# Patient Record
Sex: FEMALE | Race: WHITE | NOT HISPANIC OR LATINO
[De-identification: names, ages, dates, MRNs, and addresses within clinical notes are randomized per-mention and may not be internally consistent; named-entity substitution may affect disease eponyms.]

---

## 2017-10-29 ENCOUNTER — HEALTH MAINTENANCE LETTER (OUTPATIENT)
Age: 47
End: 2017-10-29

## 2018-01-02 ENCOUNTER — RADIANT APPOINTMENT (OUTPATIENT)
Dept: MAMMOGRAPHY | Facility: CLINIC | Age: 48
End: 2018-01-02
Attending: OBSTETRICS & GYNECOLOGY
Payer: COMMERCIAL

## 2018-01-02 DIAGNOSIS — Z12.31 ENCOUNTER FOR SCREENING MAMMOGRAM FOR BREAST CANCER: ICD-10-CM

## 2018-01-02 PROCEDURE — 77063 BREAST TOMOSYNTHESIS BI: CPT

## 2018-01-02 PROCEDURE — 77067 SCR MAMMO BI INCL CAD: CPT

## 2018-08-30 PROBLEM — D25.9 FIBROID UTERUS: Chronic | Status: ACTIVE | Noted: 2018-08-30

## 2018-08-30 PROBLEM — N80.9 ENDOMETRIOSIS: Chronic | Status: ACTIVE | Noted: 2018-08-30

## 2018-08-30 PROBLEM — D64.9 ANEMIA: Status: ACTIVE | Noted: 2018-08-30

## 2018-08-31 ENCOUNTER — HOSPITAL ENCOUNTER (INPATIENT)
Facility: CLINIC | Age: 48
LOS: 3 days | Discharge: HOME | End: 2018-09-03
Attending: OBSTETRICS & GYNECOLOGY | Admitting: OBSTETRICS & GYNECOLOGY
Payer: COMMERCIAL

## 2018-08-31 ENCOUNTER — ANESTHESIA EVENT (OUTPATIENT)
Dept: SURGERY | Facility: CLINIC | Age: 48
End: 2018-08-31
Payer: COMMERCIAL

## 2018-08-31 ENCOUNTER — ANESTHESIA (OUTPATIENT)
Dept: SURGERY | Facility: CLINIC | Age: 48
End: 2018-08-31
Payer: COMMERCIAL

## 2018-08-31 DIAGNOSIS — Z98.890 POST-OPERATIVE STATE: Primary | ICD-10-CM

## 2018-08-31 LAB
B-HCG SERPL-ACNC: <1 IU/L (ref 0–5)
HGB BLD-MCNC: 9.5 G/DL (ref 11.7–15.7)

## 2018-08-31 PROCEDURE — 0UT10ZZ RESECTION OF LEFT OVARY, OPEN APPROACH: ICD-10-PCS | Performed by: OBSTETRICS & GYNECOLOGY

## 2018-08-31 PROCEDURE — 25000128 H RX IP 250 OP 636: Performed by: ANESTHESIOLOGY

## 2018-08-31 PROCEDURE — 71000012 ZZH RECOVERY PHASE 1 LEVEL 1 FIRST HR: Performed by: OBSTETRICS & GYNECOLOGY

## 2018-08-31 PROCEDURE — 36415 COLL VENOUS BLD VENIPUNCTURE: CPT | Performed by: ANESTHESIOLOGY

## 2018-08-31 PROCEDURE — 88302 TISSUE EXAM BY PATHOLOGIST: CPT | Performed by: OBSTETRICS & GYNECOLOGY

## 2018-08-31 PROCEDURE — 25000125 ZZHC RX 250: Performed by: ANESTHESIOLOGY

## 2018-08-31 PROCEDURE — 71000013 ZZH RECOVERY PHASE 1 LEVEL 1 EA ADDTL HR: Performed by: OBSTETRICS & GYNECOLOGY

## 2018-08-31 PROCEDURE — 88305 TISSUE EXAM BY PATHOLOGIST: CPT | Performed by: OBSTETRICS & GYNECOLOGY

## 2018-08-31 PROCEDURE — 25000128 H RX IP 250 OP 636: Performed by: OBSTETRICS & GYNECOLOGY

## 2018-08-31 PROCEDURE — 25000125 ZZHC RX 250: Performed by: NURSE ANESTHETIST, CERTIFIED REGISTERED

## 2018-08-31 PROCEDURE — 88305 TISSUE EXAM BY PATHOLOGIST: CPT | Mod: 26 | Performed by: OBSTETRICS & GYNECOLOGY

## 2018-08-31 PROCEDURE — 25000566 ZZH SEVOFLURANE, EA 15 MIN: Performed by: OBSTETRICS & GYNECOLOGY

## 2018-08-31 PROCEDURE — 0UT90ZZ RESECTION OF UTERUS, OPEN APPROACH: ICD-10-PCS | Performed by: OBSTETRICS & GYNECOLOGY

## 2018-08-31 PROCEDURE — 0UB70ZZ EXCISION OF BILATERAL FALLOPIAN TUBES, OPEN APPROACH: ICD-10-PCS | Performed by: OBSTETRICS & GYNECOLOGY

## 2018-08-31 PROCEDURE — 88307 TISSUE EXAM BY PATHOLOGIST: CPT | Performed by: OBSTETRICS & GYNECOLOGY

## 2018-08-31 PROCEDURE — 85018 HEMOGLOBIN: CPT | Performed by: ANESTHESIOLOGY

## 2018-08-31 PROCEDURE — 27210995 ZZH RX 272: Performed by: OBSTETRICS & GYNECOLOGY

## 2018-08-31 PROCEDURE — 36000058 ZZH SURGERY LEVEL 3 EA 15 ADDTL MIN: Performed by: OBSTETRICS & GYNECOLOGY

## 2018-08-31 PROCEDURE — 88302 TISSUE EXAM BY PATHOLOGIST: CPT | Mod: 26 | Performed by: OBSTETRICS & GYNECOLOGY

## 2018-08-31 PROCEDURE — 88307 TISSUE EXAM BY PATHOLOGIST: CPT | Mod: 26 | Performed by: OBSTETRICS & GYNECOLOGY

## 2018-08-31 PROCEDURE — 12000007 ZZH R&B INTERMEDIATE

## 2018-08-31 PROCEDURE — 25000128 H RX IP 250 OP 636: Performed by: NURSE ANESTHETIST, CERTIFIED REGISTERED

## 2018-08-31 PROCEDURE — 40000170 ZZH STATISTIC PRE-PROCEDURE ASSESSMENT II: Performed by: OBSTETRICS & GYNECOLOGY

## 2018-08-31 PROCEDURE — 27210794 ZZH OR GENERAL SUPPLY STERILE: Performed by: OBSTETRICS & GYNECOLOGY

## 2018-08-31 PROCEDURE — 37000009 ZZH ANESTHESIA TECHNICAL FEE, EACH ADDTL 15 MIN: Performed by: OBSTETRICS & GYNECOLOGY

## 2018-08-31 PROCEDURE — 84702 CHORIONIC GONADOTROPIN TEST: CPT | Performed by: ANESTHESIOLOGY

## 2018-08-31 PROCEDURE — 0USG0ZZ REPOSITION VAGINA, OPEN APPROACH: ICD-10-PCS | Performed by: OBSTETRICS & GYNECOLOGY

## 2018-08-31 PROCEDURE — 37000008 ZZH ANESTHESIA TECHNICAL FEE, 1ST 30 MIN: Performed by: OBSTETRICS & GYNECOLOGY

## 2018-08-31 PROCEDURE — 25000132 ZZH RX MED GY IP 250 OP 250 PS 637: Performed by: OBSTETRICS & GYNECOLOGY

## 2018-08-31 PROCEDURE — 36000056 ZZH SURGERY LEVEL 3 1ST 30 MIN: Performed by: OBSTETRICS & GYNECOLOGY

## 2018-08-31 RX ORDER — GLYCOPYRROLATE 0.2 MG/ML
INJECTION, SOLUTION INTRAMUSCULAR; INTRAVENOUS PRN
Status: DISCONTINUED | OUTPATIENT
Start: 2018-08-31 | End: 2018-08-31

## 2018-08-31 RX ORDER — SODIUM CHLORIDE, SODIUM LACTATE, POTASSIUM CHLORIDE, CALCIUM CHLORIDE 600; 310; 30; 20 MG/100ML; MG/100ML; MG/100ML; MG/100ML
INJECTION, SOLUTION INTRAVENOUS CONTINUOUS
Status: DISCONTINUED | OUTPATIENT
Start: 2018-08-31 | End: 2018-09-03 | Stop reason: HOSPADM

## 2018-08-31 RX ORDER — SODIUM CHLORIDE, SODIUM LACTATE, POTASSIUM CHLORIDE, CALCIUM CHLORIDE 600; 310; 30; 20 MG/100ML; MG/100ML; MG/100ML; MG/100ML
INJECTION, SOLUTION INTRAVENOUS CONTINUOUS
Status: DISCONTINUED | OUTPATIENT
Start: 2018-08-31 | End: 2018-08-31 | Stop reason: HOSPADM

## 2018-08-31 RX ORDER — ONDANSETRON 4 MG/1
4 TABLET, ORALLY DISINTEGRATING ORAL EVERY 30 MIN PRN
Status: DISCONTINUED | OUTPATIENT
Start: 2018-08-31 | End: 2018-08-31 | Stop reason: HOSPADM

## 2018-08-31 RX ORDER — ONDANSETRON 4 MG/1
4 TABLET, ORALLY DISINTEGRATING ORAL EVERY 6 HOURS PRN
Status: DISCONTINUED | OUTPATIENT
Start: 2018-08-31 | End: 2018-09-03 | Stop reason: HOSPADM

## 2018-08-31 RX ORDER — METOCLOPRAMIDE 5 MG/1
10 TABLET ORAL EVERY 6 HOURS PRN
Status: DISCONTINUED | OUTPATIENT
Start: 2018-08-31 | End: 2018-09-03 | Stop reason: HOSPADM

## 2018-08-31 RX ORDER — ACETAMINOPHEN 325 MG/1
975 TABLET ORAL EVERY 8 HOURS
Status: DISCONTINUED | OUTPATIENT
Start: 2018-08-31 | End: 2018-09-03 | Stop reason: HOSPADM

## 2018-08-31 RX ORDER — DEXAMETHASONE SODIUM PHOSPHATE 4 MG/ML
INJECTION, SOLUTION INTRA-ARTICULAR; INTRALESIONAL; INTRAMUSCULAR; INTRAVENOUS; SOFT TISSUE PRN
Status: DISCONTINUED | OUTPATIENT
Start: 2018-08-31 | End: 2018-08-31

## 2018-08-31 RX ORDER — TEMAZEPAM 15 MG/1
15 CAPSULE ORAL
Status: DISCONTINUED | OUTPATIENT
Start: 2018-09-01 | End: 2018-09-03 | Stop reason: HOSPADM

## 2018-08-31 RX ORDER — FENTANYL CITRATE 50 UG/ML
INJECTION, SOLUTION INTRAMUSCULAR; INTRAVENOUS PRN
Status: DISCONTINUED | OUTPATIENT
Start: 2018-08-31 | End: 2018-08-31

## 2018-08-31 RX ORDER — MAGNESIUM HYDROXIDE 1200 MG/15ML
LIQUID ORAL PRN
Status: DISCONTINUED | OUTPATIENT
Start: 2018-08-31 | End: 2018-08-31 | Stop reason: HOSPADM

## 2018-08-31 RX ORDER — PROPOFOL 10 MG/ML
INJECTION, EMULSION INTRAVENOUS PRN
Status: DISCONTINUED | OUTPATIENT
Start: 2018-08-31 | End: 2018-08-31

## 2018-08-31 RX ORDER — KETOROLAC TROMETHAMINE 30 MG/ML
INJECTION, SOLUTION INTRAMUSCULAR; INTRAVENOUS PRN
Status: DISCONTINUED | OUTPATIENT
Start: 2018-08-31 | End: 2018-08-31

## 2018-08-31 RX ORDER — CEFAZOLIN SODIUM 2 G/100ML
2 INJECTION, SOLUTION INTRAVENOUS
Status: COMPLETED | OUTPATIENT
Start: 2018-08-31 | End: 2018-08-31

## 2018-08-31 RX ORDER — ONDANSETRON 2 MG/ML
INJECTION INTRAMUSCULAR; INTRAVENOUS PRN
Status: DISCONTINUED | OUTPATIENT
Start: 2018-08-31 | End: 2018-08-31

## 2018-08-31 RX ORDER — NALOXONE HYDROCHLORIDE 0.4 MG/ML
.1-.4 INJECTION, SOLUTION INTRAMUSCULAR; INTRAVENOUS; SUBCUTANEOUS
Status: DISCONTINUED | OUTPATIENT
Start: 2018-08-31 | End: 2018-08-31

## 2018-08-31 RX ORDER — NALOXONE HYDROCHLORIDE 0.4 MG/ML
.1-.4 INJECTION, SOLUTION INTRAMUSCULAR; INTRAVENOUS; SUBCUTANEOUS
Status: DISCONTINUED | OUTPATIENT
Start: 2018-08-31 | End: 2018-09-03 | Stop reason: HOSPADM

## 2018-08-31 RX ORDER — ONDANSETRON 2 MG/ML
4 INJECTION INTRAMUSCULAR; INTRAVENOUS EVERY 6 HOURS PRN
Status: DISCONTINUED | OUTPATIENT
Start: 2018-08-31 | End: 2018-09-03 | Stop reason: HOSPADM

## 2018-08-31 RX ORDER — HYDROMORPHONE HYDROCHLORIDE 1 MG/ML
.3-.5 INJECTION, SOLUTION INTRAMUSCULAR; INTRAVENOUS; SUBCUTANEOUS EVERY 5 MIN PRN
Status: DISCONTINUED | OUTPATIENT
Start: 2018-08-31 | End: 2018-08-31 | Stop reason: HOSPADM

## 2018-08-31 RX ORDER — OXYCODONE HYDROCHLORIDE 5 MG/1
5-10 TABLET ORAL
Status: DISCONTINUED | OUTPATIENT
Start: 2018-08-31 | End: 2018-09-03 | Stop reason: HOSPADM

## 2018-08-31 RX ORDER — VECURONIUM BROMIDE 1 MG/ML
INJECTION, POWDER, LYOPHILIZED, FOR SOLUTION INTRAVENOUS PRN
Status: DISCONTINUED | OUTPATIENT
Start: 2018-08-31 | End: 2018-08-31

## 2018-08-31 RX ORDER — FENTANYL CITRATE 50 UG/ML
25-50 INJECTION, SOLUTION INTRAMUSCULAR; INTRAVENOUS
Status: DISCONTINUED | OUTPATIENT
Start: 2018-08-31 | End: 2018-08-31 | Stop reason: HOSPADM

## 2018-08-31 RX ORDER — METOCLOPRAMIDE HYDROCHLORIDE 5 MG/ML
10 INJECTION INTRAMUSCULAR; INTRAVENOUS EVERY 6 HOURS PRN
Status: DISCONTINUED | OUTPATIENT
Start: 2018-08-31 | End: 2018-09-03 | Stop reason: HOSPADM

## 2018-08-31 RX ORDER — CEFAZOLIN SODIUM 1 G/3ML
1 INJECTION, POWDER, FOR SOLUTION INTRAMUSCULAR; INTRAVENOUS SEE ADMIN INSTRUCTIONS
Status: DISCONTINUED | OUTPATIENT
Start: 2018-08-31 | End: 2018-08-31 | Stop reason: HOSPADM

## 2018-08-31 RX ORDER — ONDANSETRON 2 MG/ML
4 INJECTION INTRAMUSCULAR; INTRAVENOUS EVERY 30 MIN PRN
Status: DISCONTINUED | OUTPATIENT
Start: 2018-08-31 | End: 2018-08-31 | Stop reason: HOSPADM

## 2018-08-31 RX ORDER — NEOSTIGMINE METHYLSULFATE 1 MG/ML
VIAL (ML) INJECTION PRN
Status: DISCONTINUED | OUTPATIENT
Start: 2018-08-31 | End: 2018-08-31

## 2018-08-31 RX ORDER — AMOXICILLIN 250 MG
2 CAPSULE ORAL 2 TIMES DAILY
Status: DISCONTINUED | OUTPATIENT
Start: 2018-08-31 | End: 2018-09-03 | Stop reason: HOSPADM

## 2018-08-31 RX ORDER — EPHEDRINE SULFATE 50 MG/ML
INJECTION, SOLUTION INTRAMUSCULAR; INTRAVENOUS; SUBCUTANEOUS PRN
Status: DISCONTINUED | OUTPATIENT
Start: 2018-08-31 | End: 2018-08-31

## 2018-08-31 RX ORDER — DIPHENHYDRAMINE HYDROCHLORIDE 50 MG/ML
25 INJECTION INTRAMUSCULAR; INTRAVENOUS EVERY 6 HOURS PRN
Status: DISCONTINUED | OUTPATIENT
Start: 2018-08-31 | End: 2018-09-03 | Stop reason: HOSPADM

## 2018-08-31 RX ORDER — PROPOFOL 10 MG/ML
INJECTION, EMULSION INTRAVENOUS CONTINUOUS PRN
Status: DISCONTINUED | OUTPATIENT
Start: 2018-08-31 | End: 2018-08-31

## 2018-08-31 RX ORDER — DIPHENHYDRAMINE HCL 25 MG
25 CAPSULE ORAL EVERY 6 HOURS PRN
Status: DISCONTINUED | OUTPATIENT
Start: 2018-08-31 | End: 2018-09-03 | Stop reason: HOSPADM

## 2018-08-31 RX ORDER — LIDOCAINE 40 MG/G
CREAM TOPICAL
Status: DISCONTINUED | OUTPATIENT
Start: 2018-08-31 | End: 2018-09-03 | Stop reason: HOSPADM

## 2018-08-31 RX ORDER — MEPERIDINE HYDROCHLORIDE 25 MG/ML
12.5 INJECTION INTRAMUSCULAR; INTRAVENOUS; SUBCUTANEOUS EVERY 5 MIN PRN
Status: DISCONTINUED | OUTPATIENT
Start: 2018-08-31 | End: 2018-08-31 | Stop reason: HOSPADM

## 2018-08-31 RX ORDER — SCOLOPAMINE TRANSDERMAL SYSTEM 1 MG/1
1 PATCH, EXTENDED RELEASE TRANSDERMAL ONCE
Status: COMPLETED | OUTPATIENT
Start: 2018-08-31 | End: 2018-08-31

## 2018-08-31 RX ORDER — FERROUS SULFATE 325(65) MG
325 TABLET ORAL 3 TIMES DAILY
COMMUNITY

## 2018-08-31 RX ORDER — LIDOCAINE HYDROCHLORIDE 20 MG/ML
INJECTION, SOLUTION INFILTRATION; PERINEURAL PRN
Status: DISCONTINUED | OUTPATIENT
Start: 2018-08-31 | End: 2018-08-31

## 2018-08-31 RX ORDER — AMOXICILLIN 250 MG
1 CAPSULE ORAL 2 TIMES DAILY
Status: DISCONTINUED | OUTPATIENT
Start: 2018-08-31 | End: 2018-09-03 | Stop reason: HOSPADM

## 2018-08-31 RX ORDER — KETOROLAC TROMETHAMINE 30 MG/ML
30 INJECTION, SOLUTION INTRAMUSCULAR; INTRAVENOUS EVERY 6 HOURS PRN
Status: DISCONTINUED | OUTPATIENT
Start: 2018-08-31 | End: 2018-09-01

## 2018-08-31 RX ORDER — ACETAMINOPHEN 325 MG/1
650 TABLET ORAL EVERY 4 HOURS PRN
Status: DISCONTINUED | OUTPATIENT
Start: 2018-09-03 | End: 2018-09-03 | Stop reason: HOSPADM

## 2018-08-31 RX ADMIN — Medication 5 MG: at 07:57

## 2018-08-31 RX ADMIN — FENTANYL CITRATE 50 MCG: 50 INJECTION, SOLUTION INTRAMUSCULAR; INTRAVENOUS at 08:25

## 2018-08-31 RX ADMIN — SODIUM CHLORIDE, POTASSIUM CHLORIDE, SODIUM LACTATE AND CALCIUM CHLORIDE: 600; 310; 30; 20 INJECTION, SOLUTION INTRAVENOUS at 14:14

## 2018-08-31 RX ADMIN — NEOSTIGMINE METHYLSULFATE 3 MG: 1 INJECTION, SOLUTION INTRAVENOUS at 09:58

## 2018-08-31 RX ADMIN — LIDOCAINE HYDROCHLORIDE 0.5 ML: 10 INJECTION, SOLUTION EPIDURAL; INFILTRATION; INTRACAUDAL; PERINEURAL at 06:38

## 2018-08-31 RX ADMIN — PROPOFOL 180 MG: 10 INJECTION, EMULSION INTRAVENOUS at 07:50

## 2018-08-31 RX ADMIN — DEXAMETHASONE SODIUM PHOSPHATE 4 MG: 4 INJECTION, SOLUTION INTRA-ARTICULAR; INTRALESIONAL; INTRAMUSCULAR; INTRAVENOUS; SOFT TISSUE at 07:56

## 2018-08-31 RX ADMIN — CEFAZOLIN SODIUM 2 G: 2 INJECTION, SOLUTION INTRAVENOUS at 07:54

## 2018-08-31 RX ADMIN — PROPOFOL 30 MCG/KG/MIN: 10 INJECTION, EMULSION INTRAVENOUS at 07:55

## 2018-08-31 RX ADMIN — Medication 0.5 MG: at 10:46

## 2018-08-31 RX ADMIN — Medication: at 11:33

## 2018-08-31 RX ADMIN — ROCURONIUM BROMIDE 50 MG: 10 INJECTION INTRAVENOUS at 07:50

## 2018-08-31 RX ADMIN — CEFAZOLIN 1 G: 1 INJECTION, POWDER, FOR SOLUTION INTRAMUSCULAR; INTRAVENOUS at 09:50

## 2018-08-31 RX ADMIN — FENTANYL CITRATE 50 MCG: 50 INJECTION, SOLUTION INTRAMUSCULAR; INTRAVENOUS at 08:10

## 2018-08-31 RX ADMIN — Medication 5 MG: at 07:56

## 2018-08-31 RX ADMIN — SENNOSIDES AND DOCUSATE SODIUM 1 TABLET: 8.6; 5 TABLET ORAL at 14:54

## 2018-08-31 RX ADMIN — ACETAMINOPHEN 975 MG: 325 TABLET, FILM COATED ORAL at 14:54

## 2018-08-31 RX ADMIN — SODIUM CHLORIDE, POTASSIUM CHLORIDE, SODIUM LACTATE AND CALCIUM CHLORIDE: 600; 310; 30; 20 INJECTION, SOLUTION INTRAVENOUS at 06:38

## 2018-08-31 RX ADMIN — FENTANYL CITRATE 25 MCG: 50 INJECTION, SOLUTION INTRAMUSCULAR; INTRAVENOUS at 10:24

## 2018-08-31 RX ADMIN — MIDAZOLAM 2 MG: 1 INJECTION INTRAMUSCULAR; INTRAVENOUS at 07:45

## 2018-08-31 RX ADMIN — SCOLOPAMINE TRANSDERMAL SYSTEM 1 PATCH: 1 PATCH, EXTENDED RELEASE TRANSDERMAL at 06:59

## 2018-08-31 RX ADMIN — ONDANSETRON 4 MG: 2 INJECTION INTRAMUSCULAR; INTRAVENOUS at 09:23

## 2018-08-31 RX ADMIN — LIDOCAINE HYDROCHLORIDE 100 MG: 20 INJECTION, SOLUTION INFILTRATION; PERINEURAL at 07:50

## 2018-08-31 RX ADMIN — Medication 0.5 MG: at 11:01

## 2018-08-31 RX ADMIN — ONDANSETRON 4 MG: 2 INJECTION INTRAMUSCULAR; INTRAVENOUS at 10:42

## 2018-08-31 RX ADMIN — KETOROLAC TROMETHAMINE 30 MG: 30 INJECTION, SOLUTION INTRAMUSCULAR at 10:17

## 2018-08-31 RX ADMIN — GLYCOPYRROLATE 0.4 MG: 0.2 INJECTION, SOLUTION INTRAMUSCULAR; INTRAVENOUS at 09:58

## 2018-08-31 RX ADMIN — SODIUM CHLORIDE, POTASSIUM CHLORIDE, SODIUM LACTATE AND CALCIUM CHLORIDE: 600; 310; 30; 20 INJECTION, SOLUTION INTRAVENOUS at 08:52

## 2018-08-31 RX ADMIN — VECURONIUM BROMIDE 1 MG: 1 INJECTION, POWDER, LYOPHILIZED, FOR SOLUTION INTRAVENOUS at 09:00

## 2018-08-31 RX ADMIN — ONDANSETRON 4 MG: 2 INJECTION INTRAMUSCULAR; INTRAVENOUS at 17:34

## 2018-08-31 RX ADMIN — FENTANYL CITRATE 100 MCG: 50 INJECTION, SOLUTION INTRAMUSCULAR; INTRAVENOUS at 07:50

## 2018-08-31 RX ADMIN — SENNOSIDES AND DOCUSATE SODIUM 1 TABLET: 8.6; 5 TABLET ORAL at 20:45

## 2018-08-31 ASSESSMENT — ACTIVITIES OF DAILY LIVING (ADL)
ADLS_ACUITY_SCORE: 9

## 2018-08-31 NOTE — IP AVS SNAPSHOT
[facility]    [address] [place] [address]    Phone:  [phone]                                       After Visit Summary   8/31/2018    Indira Jc    MRN: [id number]           After Visit Summary Signature Page     I have received my discharge instructions, and my questions have been answered. I have discussed any challenges I see with this plan with the nurse or doctor.    ..........................................................................................................................................  Patient/Patient Representative Signature      ..........................................................................................................................................  Patient Representative Print Name and Relationship to Patient    ..................................................               ................................................  Date                                            Time    ..........................................................................................................................................  Reviewed by Signature/Title    ...................................................              ..............................................  Date                                                            Time          22EPIC Rev 08/18

## 2018-08-31 NOTE — ANESTHESIA PREPROCEDURE EVALUATION
Anesthesia Evaluation     .             ROS/MED HX    ENT/Pulmonary:  - neg pulmonary ROS    (-) sleep apnea   Neurologic:  - neg neurologic ROS     Cardiovascular:  - neg cardiovascular ROS       METS/Exercise Tolerance:     Hematologic:     (+) Anemia, -      Musculoskeletal:  - neg musculoskeletal ROS       GI/Hepatic:  - neg GI/hepatic ROS      (-) GERD   Renal/Genitourinary:     (+) Other Renal/ Genitourinary, uterine fibroids, endometriosis      Endo:  - neg endo ROS       Psychiatric:  - neg psychiatric ROS       Infectious Disease:  - neg infectious disease ROS       Malignancy:      - no malignancy   Other:                     Physical Exam  Normal systems: cardiovascular, pulmonary and dental    Airway   Mallampati: II  TM distance: >3 FB  Neck ROM: full    Dental     Cardiovascular       Pulmonary                     Anesthesia Plan      History & Physical Review  History and physical reviewed and following examination; no interval change.    ASA Status:  2 .    NPO Status:  > 8 hours    Plan for General and ETT with Intravenous induction. Maintenance will be Balanced.    PONV prophylaxis:  Ondansetron (or other 5HT-3), Dexamethasone or Solumedrol and Scopolamine patch  Propofol infusion      Postoperative Care  Postoperative pain management:  IV analgesics.      Consents  Anesthetic plan, risks, benefits and alternatives discussed with:  Patient..        Procedure: Procedure(s):  COMBINED HYSTERECTOMY TOTAL ABDOMINAL, SALPINGO-OOPHORECTOMY  Preop diagnosis: UTERINE FIBROIDS AND ENDOMETRIOSIS    No Known Allergies  Past Medical History:   Diagnosis Date     AMA (advanced maternal age) multigravida 35+ 5/15/2015     Anemia      Endometriosis      Fibroid uterus      Past Surgical History:   Procedure Laterality Date     ABDOMEN SURGERY      exploratory lap x2     ABDOMEN SURGERY      C section     GYN SURGERY      myomectomy     Social History   Substance Use Topics     Smoking status: Never Smoker      Smokeless tobacco: Current User     Alcohol use Yes      Comment: 1 glass of wine a day     Prior to Admission medications    Medication Sig Start Date End Date Taking? Authorizing Provider   ferrous sulfate (IRON) 325 (65 Fe) MG tablet Take 325 mg by mouth 3 times daily   Yes Reported, Patient     Current Facility-Administered Medications Ordered in Epic   Medication Dose Route Frequency Last Rate Last Dose     ceFAZolin (ANCEF) 1 g vial to attach to  ml bag for ADULT or 50 ml bag for PEDS  1 g Intravenous See Admin Instructions         ceFAZolin (ANCEF) intermittent infusion 2 g in 100 mL dextrose PRE-MIX  2 g Intravenous Pre-Op/Pre-procedure x 1 dose         lactated ringers infusion   Intravenous Continuous 25 mL/hr at 08/31/18 0638       lidocaine 1 % 1 mL  1 mL Other Q1H PRN   0.5 mL at 08/31/18 0638     sodium chloride (PF) 0.9% PF flush 3 mL  3 mL Intracatheter Q8H         No current [facility]-ordered outpatient prescriptions on file.       lactated ringers 25 mL/hr at 08/31/18 0638     Wt Readings from Last 1 Encounters:   08/31/18 55.8 kg (123 lb)     Temp Readings from Last 1 Encounters:   08/31/18 36.6  C (97.8  F) (Temporal)     BP Readings from Last 6 Encounters:   08/31/18 113/61     Pulse Readings from Last 4 Encounters:   08/31/18 66     Resp Readings from Last 1 Encounters:   08/31/18 16     SpO2 Readings from Last 1 Encounters:   08/31/18 100%     No results for input(s): NA, POTASSIUM, CHLORIDE, CO2, ANIONGAP, GLC, BUN, CR, JONAS in the last 69949 hours.  No results for input(s): AST, ALT, ALKPHOS, BILITOTAL, LIPASE in the last 92181 hours.  No results for input(s): WBC, HGB, PLT in the last 86867 hours.  No results for input(s): ABO, RH in the last 62681 hours.  No results for input(s): INR, PTT in the last 07068 hours.   No results for input(s): TROPI in the last 22263 hours.  No results for input(s): PH, PCO2, PO2, HCO3 in the last 14819 hours.  No results for input(s): HCG in the last 03047  hours.  No results found for this or any previous visit (from the past 744 hour(s)).    RECENT LABS:   ECG:   ECHO:                       .

## 2018-08-31 NOTE — OP NOTE
Gynecology Brief Operative Note    Pre-operative diagnosis: UTERINE FIBROIDS AND ENDOMETRIOSIS--PELVIC PAIN   Post-operative diagnosis Same   Procedure: Procedure(s):  COMBINED HYSTERECTOMY TOTAL ABDOMINAL,LEFT  SALPINGO-OOPHORECTOMY, RIGHT SALPINGECTOMY--MODIFIED VAULT SUSUPENSION   Surgeon: Александр Smyth MD   Assistants(s): DR SIMMS   Anesthesia: General    Estimated blood loss: 100 CC    Total IV fluids: (See anesthesia record)   Blood transfusion: No transfusion was given during surgery   Total urine output: (See anesthesia record)   Drains: None   Specimens: UTERUS AN BOTH TUBES AND LEFT OVARY   Implants: None   Findings: FIBROID , ENDOMETRIOSIS  RECTUM ATTACHED TO CDS AND UTERUS---BOWEL ADHESIONS TO LEFT OVARY AND UTERUS   Complications: None   Condition: Stable   Comments: See dictated operative report for full details

## 2018-08-31 NOTE — IP AVS SNAPSHOT
MRN:[id number]                      After Visit Summary   8/31/2018    Indira Jc    MRN: [id number]           Thank you!     Thank you for choosing [facility] for your care. Our goal is always to provide you with excellent care. Hearing back from our patients is one way we can continue to improve our services. Please take a few minutes to complete the written survey that you may receive in the mail after you visit with us. Thank you!        Patient Information     Date Of Birth          1970        Designated Caregiver       Most Recent Value    Caregiver    Will someone help with your care after discharge? yes    Name of designated caregiver Brady-spouse    Phone number of caregiver [phone]    Caregiver address [address]      About your hospital stay     You were admitted on:  August 31, 2018 You last received care in the:  [facility]    You were discharged on:  September 3, 2018       Who to Call     For medical emergencies, please call 911.  For non-urgent questions about your medical care, please call your primary care provider or clinic, [phone]  For questions related to your surgery, please call your surgery clinic        Attending Provider     Provider Specialty    Александр Smyth MD OB/Gyn       Primary Care Provider Office Phone # Fax #    Александр Smyth [phone]      After Care Instructions     Activity       Your activity upon discharge: no sex for 6 weeks, no driving while on analgesics and no heavy lifting for 6 weeks            Diet       Follow this diet upon discharge: Regular            Wound care and dressings       Instructions to care for your wound at home: remove steri-strips in 7-10 days.                  Follow-up Appointments     Follow-up and recommended labs and tests        Follow up with Dr. Smyth , at (location with clinic name or city) [facility], within 2 weeks  to evaluate after  surgery.                  Further instructions from your care team       Discharge Instructions following Hysterectomy  [facility]    Please return to the clinic in:       2 weeks;         4 weeks;          6 weeks   Make this appointment after you get home.  Diet:    You may feel less hungry at first.  Try to eat a well balanced diet with lots of proteins, fruits, vegetables, and whole grains.  Avoid spicy and greasy foods.    Drink plenty of fluids - at least 8 tall glasses a day.  Try to limit caffeine (found in coffee, tea, and cola drinks).  This helps prevent constipation (hard stools that are difficult to pass).    If constipation is a problem,  consider one of these medicines: docusate (Colace), docusate with casanthranol (Vero-Colace), psyllium (Metamucil) or milk of magnesia.  You can buy these at the drug store.  Follow the instructions listed on the label.  Activity:    You will need plenty of rest at first.  Slowly go back to your normal activities.  It will help to take several short walks during the day.  It is ok to climb stairs, but use the handrail in case you get dizzy.    Do not drive while using strong pain medications (narcotics).  After that, do not drive until you can stomp on the brakes without pain.      Do not use tampons, douche, or have sex (intercourse) until you see your doctor again.    Don t lift or push anything over 20 lbs until your doctor says it s safe.  Avoid heavy or strenuous exercise.    Your doctor will tell you when you can safely return to work.  Pain Control:    You may have pain around your incisions (surgery wounds).  This should improve over the next week.  Use your pain medicines as directed.  If you have increased pain, please call the clinic.  It is normal to see a little sore after mild exercise.    For the next two days, you may have some pain in your shoulders and upper chest.  This is from the air pumped into your during  the surgery.  To relieve this type of pain, you may take Tylenol (acetaminophen) or Advil (ibuprofen).  Follow the instructions listed on the label.  Drink a full glass of water with each dose.  You can also try lying flat or rising your hips above shoulder level.  Short, frequent walks should help as well.  Bathing/Incision Care:    May shower as desired but avoid tub baths (submerging incision) until incisions are healed.    If present, Steri-Strips (small, white tape) will fall off on their own in 7-10 days.    Your body will absorb our stitches over time.  Keep them clean and dry.    Wear loose, comfortable clothing.  Normal Symptoms:    You may notice a small amount of bleeding from your vagina.  This could last up to a week.  You may also have brown fluid leaking from the vagina.  This could last for a few weeks.  Wear pads as needed.    You may have bruising on your belly.  You might also have a puffy feeling in your belly.    You may see a little blood or fluid oozing from the incision.  Hormones:    If we removed your ovaries, you may need hormone medicine (HT, or hormone therapy).  Discuss this with your doctor.  If we did not remove your ovaries, you should reach menopause at the normal time (in general, between ages 40 and 55).  Notify your surgeon for the following signs and symptoms:    Severe chills and temperature of 100.4 oF or greater, taken under the tongue.    Bright red blood or large clots coming out of the vagina - enough to soak one pad (sanitary napkin) per hour.    Increased pain, warmth, swelling, redness at incision site.    Foul smelling, green/yellow discharge from incision.    Urine or vaginal fluid that smells bad.    You cannot urinate (use the toilet), it burns when you urinate, or you need to go more often.    Severe nausea (feeling sick to your stomach) or vomiting (throwing up).    Increased pain that you cannot control with medicine.    Any questions or concerns.      Pending  "Results     Date and Time Order Name Status Description    2018 0850 Surgical pathology exam In process             Statement of Approval     Ordered          18 0922  I have reviewed and agree with all the recommendations and orders detailed in this document.  EFFECTIVE NOW     Comments:  DC 9/3 if afebrile and doing well. Remove staples prior to DC.   Approved and electronically signed by:  Libra David MD             Admission Information     Date & Time Provider Department Dept. Phone    2018 Александр Smyth MD [place] [address] [facility] [phone]      Your Vitals Were     Blood Pressure Pulse Temperature Respirations Height Weight    103/59 57 98.4  F (36.9  C) (Oral) 15 1.676 m (5' 6\") 58.6 kg (129 lb 3 oz)    Last Period Pulse Oximetry BMI (Body Mass Index)             2018 96% 20.85 kg/m2         [email or web address] Information     [email or web address] lets you send messages to your doctor, view your test results, renew your prescriptions, schedule appointments and more. To sign up, go to [email or web address] . Click on \"Log in\" on the left side of the screen, which will take you to the Welcome page. Then click on \"Sign up Now\" on the right side of the page.     You will be asked to enter the access code listed below, as well as some personal information. Please follow the directions to create your username and password.     Your access code is: [email or web address]  Expires: 2018 10:07 AM     Your access code will  in 90 days. If you need help or a new code, please call your [place] clinic or [phone].        Care EveryWhere ID     This is your Care EveryWhere ID. This could be used by other organizations to access your [facility] medical records  [id number]        Equal Access to Services     CATHY HARDEN : Nabor Danielle, waboyda luhever, qaybta kaalkeiry berger, saige meredith. So [facility] [phone].    ATENCIÓN: Si habla " español, tiene a arango disposición servicios gratuitos de asistencia lingüística. Olayinka trejo [phone].    We comply with applicable federal civil rights laws and [place] laws. We do not discriminate on the basis of race, color, national origin, age, disability, sex, sexual orientation, or gender identity.               Review of your medicines      START taking        Dose / Directions    oxyCODONE IR 5 MG tablet   Commonly known as:  ROXICODONE        Dose:  5-10 mg   Take 1-2 tablets (5-10 mg) by mouth every 3 hours as needed for other (pain control or improvement in physical function. Hold dose for analgesic side effects.)   Quantity:  30 tablet   Refills:  0         CONTINUE these medicines which have NOT CHANGED        Dose / Directions    ferrous sulfate 325 (65 Fe) MG tablet   Commonly known as:  IRON        Dose:  325 mg   Take 325 mg by mouth 3 times daily   Refills:  0            Where to get your medicines      Some of these will need a paper prescription and others can be bought over the counter. Ask your nurse if you have questions.     Bring a paper prescription for each of these medications     oxyCODONE IR 5 MG tablet                Protect others around you: Learn how to safely use, store and throw away your medicines at www.disposemymeds.org.        Information about OPIOIDS     PRESCRIPTION OPIOIDS: WHAT YOU NEED TO KNOW   We gave you an opioid (narcotic) pain medicine. It is important to manage your pain, but opioids are not always the best choice. You should first try all the other options your care team gave you. Take this medicine for as short a time (and as few doses) as possible.    Some activities can increase your pain, such as bandage changes or therapy sessions. It may help to take your pain medicine 30 to 60 minutes before these activities. Reduce your stress by getting enough sleep, working on hobbies you enjoy and practicing relaxation or meditation. Talk to your care team about ways  to manage your pain beyond prescription opioids.    These medicines have risks:    DO NOT drive when on new or higher doses of pain medicine. These medicines can affect your alertness and reaction times, and you could be arrested for driving under the influence (DUI). If you need to use opioids long-term, talk to your care team about driving.    DO NOT operate heavy machinery    DO NOT do any other dangerous activities while taking these medicines.    DO NOT drink any alcohol while taking these medicines.     If the opioid prescribed includes acetaminophen, DO NOT take with any other medicines that contain acetaminophen. Read all labels carefully. Look for the word  acetaminophen  or  Tylenol.  Ask your pharmacist if you have questions or are unsure.    You can get addicted to pain medicines, especially if you have a history of addiction (chemical, alcohol or substance dependence). Talk to your care team about ways to reduce this risk.    All opioids tend to cause constipation. Drink plenty of water and eat foods that have a lot of fiber, such as fruits, vegetables, prune juice, apple juice and high-fiber cereal. Take a laxative (Miralax, milk of magnesia, Colace, Senna) if you don t move your bowels at least every other day. Other side effects include upset stomach, sleepiness, dizziness, throwing up, tolerance (needing more of the medicine to have the same effect), physical dependence and slowed breathing.    Store your pills in a secure place, locked if possible. We will not replace any lost or stolen medicine. If you don t finish your medicine, please throw away (dispose) as directed by your pharmacist. The [place] Pollution Control Agency has more information about safe disposal: https://www.pca.[facility].[place].us/living-green/managing-unwanted-medications             Medication List: This is a list of all your medications and when to take them. Check marks below indicate your daily home schedule. Keep this list as  a reference.      Medications           Morning Afternoon Evening Bedtime As Needed    ferrous sulfate 325 (65 Fe) MG tablet   Commonly known as:  IRON   Take 325 mg by mouth 3 times daily                         Resume        oxyCODONE IR 5 MG tablet   Commonly known as:  ROXICODONE   Take 1-2 tablets (5-10 mg) by mouth every 3 hours as needed for other (pain control or improvement in physical function. Hold dose for analgesic side effects.)   Last time this was given:  5 mg on 9/3/2018  9:22 AM   Next Dose Due:  Available at 12:22 pm                             Available at 12:22 pm

## 2018-08-31 NOTE — PLAN OF CARE
Problem: Patient Care Overview  Goal: Plan of Care/Patient Progress Review  Outcome: Improving  Patient arrived from PACU at 1215. A&O x4. VSS on room air. HR bradycardic in the high 50s, nonsymptomatic. Lungs clear. IS to 750. BS+, gas- and BM-. Incision covered, CDI, no drainage. Minimal spotting on yasmine pad. Tolerating clears. Some nausea, PRN zofran given. Hooker patent. Dangled at bedside and briefly sat in chair, had some nausea and lightheadedness, so went back to bed. PCA for pain control with scheduled Tylenol.

## 2018-08-31 NOTE — ANESTHESIA CARE TRANSFER NOTE
Patient: Indira Jc    Procedure(s):  TOTAL ABDOMINAL HYSTERECTOMY, BILATERAL SALPINGECTOMY, LEFT OOPHORECTOMY, LYSIS OF ADHESIONS - Wound Class: II-Clean Contaminated    Diagnosis: UTERINE FIBROIDS AND ENDOMETRIOSIS  Diagnosis Additional Information: No value filed.    Anesthesia Type:   General, ETT     Note:  Airway :Face Mask  Patient transferred to:PACU  Comments: Pt exhibits spontaneous respirations, follows commands, suctioned, extubated, dentition unchanged, exchanging well, transferred to pacu with 10L O2 via mask, all monitors and alarms on, report to Tenzin RODRIGUES Report: Identifed the Patient, Identified the Reponsible Provider, Reviewed the pertinent medical history, Discussed the surgical course, Reviewed Intra-OP anesthesia mangement and issues during anesthesia, Set expectations for post-procedure period and Allowed opportunity for questions and acknowledgement of understanding      Vitals: (Last set prior to Anesthesia Care Transfer)    CRNA VITALS  8/31/2018 1001 - 8/31/2018 1037      8/31/2018             Pulse: 76    SpO2: 98 %    Resp Rate (set): 10                Electronically Signed By: GAURI Jrodan CRNA  August 31, 2018  10:37 AM

## 2018-08-31 NOTE — PROGRESS NOTES
Admission medication history interview status for the 8/31/2018  admission is complete. See EPIC admission navigator for prior to admission medications     Medication history source reliability:Good    Medication history interview source(s):Patient    Medication history resources (including written lists, pill bottles, clinic record):None    Primary pharmacy.[facility]    Additional medication history information not noted on PTA med list :None    Time spent in this activity: 40 minutes    Prior to Admission medications    Medication Sig Last Dose Taking? Auth Provider   ferrous sulfate (IRON) 325 (65 Fe) MG tablet Take 325 mg by mouth 3 times daily 8/30/2018 at 2100 Yes Reported, Patient

## 2018-08-31 NOTE — ANESTHESIA POSTPROCEDURE EVALUATION
Patient: Indira Jc    Procedure(s):  TOTAL ABDOMINAL HYSTERECTOMY, BILATERAL SALPINGECTOMY, LEFT OOPHORECTOMY, LYSIS OF ADHESIONS - Wound Class: II-Clean Contaminated    Diagnosis:UTERINE FIBROIDS AND ENDOMETRIOSIS  Diagnosis Additional Information: No value filed.    Anesthesia Type:  General, ETT    Note:  Anesthesia Post Evaluation    Patient location during evaluation: Bedside  Patient participation: Able to fully participate in evaluation  Level of consciousness: awake and alert  Pain management: adequate  Airway patency: patent  Cardiovascular status: acceptable  Respiratory status: acceptable  Hydration status: acceptable  PONV: none             Last vitals:  Vitals:    08/31/18 1245 08/31/18 1315 08/31/18 1415   BP: 106/63 103/60 107/63   Pulse:      Resp: 12 12 12   Temp:      SpO2: 95% 96% 97%         Electronically Signed By: Rupert Polanco MD  August 31, 2018  3:41 PM

## 2018-09-01 LAB — HGB BLD-MCNC: 8.3 G/DL (ref 11.7–15.7)

## 2018-09-01 PROCEDURE — 25000132 ZZH RX MED GY IP 250 OP 250 PS 637: Performed by: OBSTETRICS & GYNECOLOGY

## 2018-09-01 PROCEDURE — 36415 COLL VENOUS BLD VENIPUNCTURE: CPT | Performed by: OBSTETRICS & GYNECOLOGY

## 2018-09-01 PROCEDURE — 85018 HEMOGLOBIN: CPT | Performed by: OBSTETRICS & GYNECOLOGY

## 2018-09-01 PROCEDURE — 25000128 H RX IP 250 OP 636: Performed by: OBSTETRICS & GYNECOLOGY

## 2018-09-01 PROCEDURE — 12000007 ZZH R&B INTERMEDIATE

## 2018-09-01 RX ORDER — METHYLPREDNISOLONE SODIUM SUCCINATE 125 MG/2ML
125 INJECTION, POWDER, LYOPHILIZED, FOR SOLUTION INTRAMUSCULAR; INTRAVENOUS
Status: DISCONTINUED | OUTPATIENT
Start: 2018-09-01 | End: 2018-09-03 | Stop reason: HOSPADM

## 2018-09-01 RX ORDER — DIPHENHYDRAMINE HYDROCHLORIDE 50 MG/ML
50 INJECTION INTRAMUSCULAR; INTRAVENOUS
Status: DISCONTINUED | OUTPATIENT
Start: 2018-09-01 | End: 2018-09-03 | Stop reason: HOSPADM

## 2018-09-01 RX ORDER — IBUPROFEN 600 MG/1
600 TABLET, FILM COATED ORAL EVERY 6 HOURS PRN
Status: DISCONTINUED | OUTPATIENT
Start: 2018-09-01 | End: 2018-09-03 | Stop reason: HOSPADM

## 2018-09-01 RX ADMIN — SODIUM CHLORIDE, POTASSIUM CHLORIDE, SODIUM LACTATE AND CALCIUM CHLORIDE: 600; 310; 30; 20 INJECTION, SOLUTION INTRAVENOUS at 10:06

## 2018-09-01 RX ADMIN — OXYCODONE HYDROCHLORIDE 5 MG: 5 TABLET ORAL at 13:00

## 2018-09-01 RX ADMIN — IBUPROFEN 600 MG: 600 TABLET ORAL at 20:07

## 2018-09-01 RX ADMIN — ACETAMINOPHEN 975 MG: 325 TABLET, FILM COATED ORAL at 06:54

## 2018-09-01 RX ADMIN — SODIUM CHLORIDE, POTASSIUM CHLORIDE, SODIUM LACTATE AND CALCIUM CHLORIDE: 600; 310; 30; 20 INJECTION, SOLUTION INTRAVENOUS at 00:19

## 2018-09-01 RX ADMIN — OXYCODONE HYDROCHLORIDE 10 MG: 5 TABLET ORAL at 15:24

## 2018-09-01 RX ADMIN — SENNOSIDES AND DOCUSATE SODIUM 2 TABLET: 8.6; 5 TABLET ORAL at 20:07

## 2018-09-01 RX ADMIN — IRON SUCROSE 300 MG: 20 INJECTION, SOLUTION INTRAVENOUS at 12:38

## 2018-09-01 RX ADMIN — OXYCODONE HYDROCHLORIDE 5 MG: 5 TABLET ORAL at 11:56

## 2018-09-01 RX ADMIN — OXYCODONE HYDROCHLORIDE 10 MG: 5 TABLET ORAL at 18:42

## 2018-09-01 RX ADMIN — SODIUM CHLORIDE, POTASSIUM CHLORIDE, SODIUM LACTATE AND CALCIUM CHLORIDE: 600; 310; 30; 20 INJECTION, SOLUTION INTRAVENOUS at 21:07

## 2018-09-01 RX ADMIN — ACETAMINOPHEN 975 MG: 325 TABLET, FILM COATED ORAL at 15:24

## 2018-09-01 ASSESSMENT — ACTIVITIES OF DAILY LIVING (ADL)
ADLS_ACUITY_SCORE: 9

## 2018-09-01 NOTE — PLAN OF CARE
Problem: Hysterectomy (Adult)  Goal: Signs and Symptoms of Listed Potential Problems Will be Absent, Minimized or Managed (Hysterectomy)  Signs and symptoms of listed potential problems will be absent, minimized or managed by discharge/transition of care (reference Hysterectomy (Adult) CPG).   Outcome: Improving  Patient is alert and orientated X 4, up with assistance of one, vital signs stable except heart rate in 50's bradycardic. Pain managed with PCA Dilaudid. Hooker output adequate.  Minimal spotting on yasmine pad, dressing to incision CDI. Bowel sounds hypoactive. Ambulated X 2.

## 2018-09-01 NOTE — PROGRESS NOTES
[facility] Gynecology Post-Op / Progress Note         Assessment and Plan:    Assessment:   Post-operative day #1  Abdominal hysterectomy  Left Salpingo-oophorectomy  Right salpingectomy  Acute on chronic anemia, asymptomatic     Doing well.      Plan:   Ambulate  Advance activity as tolerated  Advance diet as tolerated  Hooker removal and trial of void  May shower once oob without lightheadedness  TRansition from PCA today  IV iron  Anticipate discharge POD#3           Interval History:   Pain control adequate with PCA.  Having itching, which has improved since washing the betadine from her abdomen.  Nauseated yesterday but better today.  Minimal bleeding.           Significant Problems:    None          Medications:   All medications related to the patient's surgery have been reviewed          Physical Exam:     Patient Vitals for the past 8 hrs:   BP Temp Temp src Pulse Heart Rate Resp SpO2   09/01/18 1110 95/57 98.7  F (37.1  C) Oral 56 55 16 98 %   09/01/18 0716 103/66 98.7  F (37.1  C) Oral 66 65 16 98 %   09/01/18 0654 - - - - - 16 -   09/01/18 0600 - - - - - 16 -   09/01/18 0419 96/61 98.2  F (36.8  C) Oral 58 54 16 97 %   09/01/18 0400 - - - - - 16 -   Gen: Weall appearing NAD  Resp: Non labored  Abd: Soft, nontender, non distended. Wound clean and dry with minimal or no drainage.  Surrounding skin with minimal erythema.         Data:     All laboratory data related to this surgery reviewed  Hemoglobin   Date Value Ref Range Status   09/01/2018 8.3 (L) 11.7 - 15.7 g/dL Final   08/31/2018 9.5 (L) 11.7 - 15.7 g/dL Final     All imaging studies related to this surgery reviewed    Aleisha Reece MD

## 2018-09-01 NOTE — PLAN OF CARE
Problem: Patient Care Overview  Goal: Plan of Care/Patient Progress Review  Outcome: Improving  A&O x4. VSS on room air with bradycardia in the 50s and BPs in the 90s/50s. Lungs clear. BS+, no BM or flatus. Denies N/V. Patient thinks she is having gas pains. Encouraged ambulation. Tolerating regular diet. Hooker removed, voided 115 ml total. Dressing removed by doctor, steri strips and sutures. KARRI and WDL, no drainage. Minimal drainage on yasmine pad. Can shower if wanted. Started on IV iron sucrose for anemia. DCd PCA, gave 10 mg oxycodone x2. On scheduled tylenol. Up with 1 assist.

## 2018-09-02 PROCEDURE — 25000128 H RX IP 250 OP 636: Performed by: OBSTETRICS & GYNECOLOGY

## 2018-09-02 PROCEDURE — 25000132 ZZH RX MED GY IP 250 OP 250 PS 637: Performed by: OBSTETRICS & GYNECOLOGY

## 2018-09-02 PROCEDURE — 12000007 ZZH R&B INTERMEDIATE

## 2018-09-02 RX ORDER — OXYCODONE HYDROCHLORIDE 5 MG/1
5-10 TABLET ORAL
Qty: 30 TABLET | Refills: 0 | Status: SHIPPED | OUTPATIENT
Start: 2018-09-02

## 2018-09-02 RX ADMIN — IBUPROFEN 600 MG: 600 TABLET ORAL at 14:18

## 2018-09-02 RX ADMIN — ACETAMINOPHEN 975 MG: 325 TABLET, FILM COATED ORAL at 00:26

## 2018-09-02 RX ADMIN — SENNOSIDES AND DOCUSATE SODIUM 2 TABLET: 8.6; 5 TABLET ORAL at 20:27

## 2018-09-02 RX ADMIN — OXYCODONE HYDROCHLORIDE 5 MG: 5 TABLET ORAL at 06:20

## 2018-09-02 RX ADMIN — IRON SUCROSE 300 MG: 20 INJECTION, SOLUTION INTRAVENOUS at 11:17

## 2018-09-02 RX ADMIN — ACETAMINOPHEN 975 MG: 325 TABLET, FILM COATED ORAL at 15:42

## 2018-09-02 RX ADMIN — IBUPROFEN 600 MG: 600 TABLET ORAL at 02:01

## 2018-09-02 RX ADMIN — ACETAMINOPHEN 975 MG: 325 TABLET, FILM COATED ORAL at 23:57

## 2018-09-02 RX ADMIN — SENNOSIDES AND DOCUSATE SODIUM 2 TABLET: 8.6; 5 TABLET ORAL at 08:26

## 2018-09-02 RX ADMIN — OXYCODONE HYDROCHLORIDE 10 MG: 5 TABLET ORAL at 15:42

## 2018-09-02 RX ADMIN — ACETAMINOPHEN 975 MG: 325 TABLET, FILM COATED ORAL at 08:26

## 2018-09-02 ASSESSMENT — ACTIVITIES OF DAILY LIVING (ADL)
ADLS_ACUITY_SCORE: 9

## 2018-09-02 NOTE — PLAN OF CARE
Problem: Patient Care Overview  Goal: Plan of Care/Patient Progress Review  Outcome: No Change  A&O. VSS. Complaints of gas pain. Ibuprofen, Tylenol, and Oxycodone managing pain. Heat pack provided. Passing gas. No BM.  Tolerating diet. Encouraged ambulation.

## 2018-09-02 NOTE — PROGRESS NOTES
"POD#2    S: Doing well. Voiding and ambulating. Minimal flatus. Tolerating regular diet.    O: /64 (BP Location: Left arm)  Pulse 62  Temp 98.8  F (37.1  C) (Oral)  Resp 16  Ht 1.676 m (5' 6\")  Wt 60 kg (132 lb 3.2 oz)  LMP 08/23/2018  SpO2 97%  BMI 21.34 kg/m2  Gen - NAD  Abd - Soft, NT  Inc - C/D/I  Ext - NT    A/P: 46yo POD#2 s/p SANG/LSO  1. Routine cares  2. Enc ambulation  3. Anticipate DC tomorrow, orders done    Libra David MD  "

## 2018-09-02 NOTE — PLAN OF CARE
Problem: Patient Care Overview  Goal: Plan of Care/Patient Progress Review  Outcome: Improving  A&O x4. VSS on room air. Lung sounds clear. BS+, flatus+, no BM. Tolerating POs. Voiding adequately. Incision with steri strips and staples, KARRI. Gas discomfort throughout the day, ambulation promoted. Up independently. Pain controlled with PRN ibuprofen x1 and scheduled tylenol. New IV placed in left AC.

## 2018-09-02 NOTE — PLAN OF CARE
Problem: Hysterectomy (Adult)  Goal: Signs and Symptoms of Listed Potential Problems Will be Absent, Minimized or Managed (Hysterectomy)  Signs and symptoms of listed potential problems will be absent, minimized or managed by discharge/transition of care (reference Hysterectomy (Adult) CPG).   Outcome: Improving  8084-7581: VSS on room air. A/Ox4. Incision on abdomen with steri strips, KARRI. Pain controlled with PRN oxycodone and heat. Up independently.  Regular diet. BS active, Flatus -. Voiding marginally to toilet. LS clear.

## 2018-09-03 VITALS
SYSTOLIC BLOOD PRESSURE: 103 MMHG | OXYGEN SATURATION: 96 % | RESPIRATION RATE: 14 BRPM | HEIGHT: 66 IN | WEIGHT: 129.19 LBS | HEART RATE: 57 BPM | BODY MASS INDEX: 20.76 KG/M2 | DIASTOLIC BLOOD PRESSURE: 59 MMHG | TEMPERATURE: 98.4 F

## 2018-09-03 PROCEDURE — 40000141 ZZH STATISTIC PERIPHERAL IV START W/O US GUIDANCE

## 2018-09-03 PROCEDURE — 25000128 H RX IP 250 OP 636: Performed by: OBSTETRICS & GYNECOLOGY

## 2018-09-03 PROCEDURE — 25000132 ZZH RX MED GY IP 250 OP 250 PS 637: Performed by: OBSTETRICS & GYNECOLOGY

## 2018-09-03 RX ORDER — BISACODYL 10 MG
10 SUPPOSITORY, RECTAL RECTAL DAILY PRN
Status: DISCONTINUED | OUTPATIENT
Start: 2018-09-03 | End: 2018-09-03 | Stop reason: HOSPADM

## 2018-09-03 RX ADMIN — SENNOSIDES AND DOCUSATE SODIUM 2 TABLET: 8.6; 5 TABLET ORAL at 09:22

## 2018-09-03 RX ADMIN — ACETAMINOPHEN 975 MG: 325 TABLET, FILM COATED ORAL at 09:21

## 2018-09-03 RX ADMIN — OXYCODONE HYDROCHLORIDE 10 MG: 5 TABLET ORAL at 04:55

## 2018-09-03 RX ADMIN — IRON SUCROSE 300 MG: 20 INJECTION, SOLUTION INTRAVENOUS at 09:19

## 2018-09-03 RX ADMIN — IBUPROFEN 600 MG: 600 TABLET ORAL at 09:22

## 2018-09-03 RX ADMIN — OXYCODONE HYDROCHLORIDE 5 MG: 5 TABLET ORAL at 09:22

## 2018-09-03 RX ADMIN — BISACODYL 10 MG: 10 SUPPOSITORY RECTAL at 09:12

## 2018-09-03 ASSESSMENT — ACTIVITIES OF DAILY LIVING (ADL)
ADLS_ACUITY_SCORE: 9

## 2018-09-03 NOTE — PLAN OF CARE
Problem: Hysterectomy (Adult)  Goal: Signs and Symptoms of Listed Potential Problems Will be Absent, Minimized or Managed (Hysterectomy)  Signs and symptoms of listed potential problems will be absent, minimized or managed by discharge/transition of care (reference Hysterectomy (Adult) CPG).   Outcome: No Change  A/Ox4. VSS ex. Ls clear. +Bs, +flatus. Voiding adequately. Abd incision cdi/marie with staples and steri strips. Managing pain with prn oxycodone. Tolerating diet. Up independently. Plan to discharge today.

## 2018-09-03 NOTE — PROGRESS NOTES
"POD#3    S: Doing well. Voiding and ambulating. Minimal flatus. Tolerating regular diet.    O: /68 (BP Location: Right arm)  Pulse 57  Temp 98.4  F (36.9  C) (Oral)  Resp 16  Ht 1.676 m (5' 6\")  Wt 58.6 kg (129 lb 3 oz)  LMP 08/23/2018  SpO2 96%  BMI 20.85 kg/m2  Gen - NAD  Abd - Soft, NT  Inc - C/D/I  Ext - NT    A/P: 48yo POD#3 s/p SANG/LSO  1. Routine cares  2. Try Suppository  3. DC home today    Omayra Norton MD  "

## 2018-09-03 NOTE — DISCHARGE INSTRUCTIONS
Discharge Instructions following Hysterectomy  [facility]    Please return to the clinic in:       2 weeks;         4 weeks;          6 weeks   Make this appointment after you get home.  Diet:    You may feel less hungry at first.  Try to eat a well balanced diet with lots of proteins, fruits, vegetables, and whole grains.  Avoid spicy and greasy foods.    Drink plenty of fluids - at least 8 tall glasses a day.  Try to limit caffeine (found in coffee, tea, and cola drinks).  This helps prevent constipation (hard stools that are difficult to pass).    If constipation is a problem,  consider one of these medicines: docusate (Colace), docusate with casanthranol (Vero-Colace), psyllium (Metamucil) or milk of magnesia.  You can buy these at the drug store.  Follow the instructions listed on the label.  Activity:    You will need plenty of rest at first.  Slowly go back to your normal activities.  It will help to take several short walks during the day.  It is ok to climb stairs, but use the handrail in case you get dizzy.    Do not drive while using strong pain medications (narcotics).  After that, do not drive until you can stomp on the brakes without pain.      Do not use tampons, douche, or have sex (intercourse) until you see your doctor again.    Don t lift or push anything over 20 lbs until your doctor says it s safe.  Avoid heavy or strenuous exercise.    Your doctor will tell you when you can safely return to work.  Pain Control:    You may have pain around your incisions (surgery wounds).  This should improve over the next week.  Use your pain medicines as directed.  If you have increased pain, please call the clinic.  It is normal to see a little sore after mild exercise.    For the next two days, you may have some pain in your shoulders and upper chest.  This is from the air pumped into your during the surgery.  To relieve this type of pain, you may take Tylenol  (acetaminophen) or Advil (ibuprofen).  Follow the instructions listed on the label.  Drink a full glass of water with each dose.  You can also try lying flat or rising your hips above shoulder level.  Short, frequent walks should help as well.  Bathing/Incision Care:    May shower as desired but avoid tub baths (submerging incision) until incisions are healed.    If present, Steri-Strips (small, white tape) will fall off on their own in 7-10 days.    Your body will absorb our stitches over time.  Keep them clean and dry.    Wear loose, comfortable clothing.  Normal Symptoms:    You may notice a small amount of bleeding from your vagina.  This could last up to a week.  You may also have brown fluid leaking from the vagina.  This could last for a few weeks.  Wear pads as needed.    You may have bruising on your belly.  You might also have a puffy feeling in your belly.    You may see a little blood or fluid oozing from the incision.  Hormones:    If we removed your ovaries, you may need hormone medicine (HT, or hormone therapy).  Discuss this with your doctor.  If we did not remove your ovaries, you should reach menopause at the normal time (in general, between ages 40 and 55).  Notify your surgeon for the following signs and symptoms:    Severe chills and temperature of 100.4 oF or greater, taken under the tongue.    Bright red blood or large clots coming out of the vagina - enough to soak one pad (sanitary napkin) per hour.    Increased pain, warmth, swelling, redness at incision site.    Foul smelling, green/yellow discharge from incision.    Urine or vaginal fluid that smells bad.    You cannot urinate (use the toilet), it burns when you urinate, or you need to go more often.    Severe nausea (feeling sick to your stomach) or vomiting (throwing up).    Increased pain that you cannot control with medicine.    Any questions or concerns.

## 2018-09-03 NOTE — PLAN OF CARE
Problem: Hysterectomy (Adult)  Goal: Signs and Symptoms of Listed Potential Problems Will be Absent, Minimized or Managed (Hysterectomy)  Signs and symptoms of listed potential problems will be absent, minimized or managed by discharge/transition of care (reference Hysterectomy (Adult) CPG).   Outcome: Improving  [id number]: VSS on room air. A/Ox4. Incision on abdomen with staples, KARRI. Pain controlled with PRN oxy and scheduled tylenol. Up ind.  Reg diet. BS faint, Flatus +, no BM. C/o cramping pain in abdomen, ambulation encouraged. Voiding adequately to bathroom. LS clear.

## 2018-09-03 NOTE — PLAN OF CARE
Problem: Patient Care Overview  Goal: Plan of Care/Patient Progress Review  Outcome: Improving  VSS, pain adequately controlled with Tylenol, Ibuprofen and Oxycodone. Incision intact with steri strips, staples removed per MD order. Up independently, tolerating diet, able to have BM after suppository. Discharge instructions and medications reviewed with pt, questions answered. Paper script for Oxycodone given to pt. Pt denies concerns at this time. Plan to DC home via spouse.

## 2018-09-04 LAB — COPATH REPORT: NORMAL

## 2018-09-07 NOTE — DISCHARGE SUMMARY
Admit Date:     2018   Discharge Date:     2018      SUMMARY:   The patient is a 47-year-old female who had a long history of endometriosis, uterine fibroids and pelvic pain and has come for definitive surgery.  She underwent a total abdominal hysterectomy, left salpingo-oophorectomy and right salpingectomy and modified vault suspension. Surgery went very well with 100 mL blood loss.  Findings showed a uterus and both tubes and left ovary stuck with endometriosis.  Her right ovary and tube were free.  Rectum was attached to the cul-de-sac and uterus and bowel adhesions to the left ovary and the uterus.  The pathology report showed fallopian tubes, left salpingo-oophorectomy involved with endometriosis and also ovary had a hemorrhagic corpus luteum.  The uterus had benign leiomyomata and extensive adenomyosis.  This was consistent with a history of the patient, as stated before. The first postoperative day, she was doing well, ambulating, tolerating advancing diet.  The patient was well controlled with a PCA.  She was afebrile and a hemoglobin was 8.3, but had started at 9.5.  The second postoperative day, the patient was doing well, voiding, ambulating, tolerating a regular diet and wanted to go home on the third postoperative day.  The third postoperative day, the patient was doing great, going home that day with Percocet for pain medication and a return visit in approximately 2 weeks.         YAAKOV HULL MD             D: 2018   T: 2018   MT: JOSEY      Name:     MASON KWON   MRN:      [id number]        Account:        [id number]   :      1970           Admit Date:     2018                                  Discharge Date: 2018      Document: [id number]

## 2019-01-21 ENCOUNTER — ANCILLARY PROCEDURE (OUTPATIENT)
Dept: MAMMOGRAPHY | Facility: CLINIC | Age: 49
End: 2019-01-21
Payer: COMMERCIAL

## 2019-01-21 DIAGNOSIS — Z12.31 SCREENING MAMMOGRAM, ENCOUNTER FOR: ICD-10-CM

## 2019-01-21 PROCEDURE — 77067 SCR MAMMO BI INCL CAD: CPT | Performed by: RADIOLOGY

## 2019-01-21 PROCEDURE — 77063 BREAST TOMOSYNTHESIS BI: CPT | Performed by: RADIOLOGY

## 2020-02-17 ENCOUNTER — ANCILLARY PROCEDURE (OUTPATIENT)
Dept: MAMMOGRAPHY | Facility: CLINIC | Age: 50
End: 2020-02-17
Attending: OBSTETRICS & GYNECOLOGY
Payer: COMMERCIAL

## 2020-02-17 DIAGNOSIS — Z12.31 SCREENING MAMMOGRAM, ENCOUNTER FOR: ICD-10-CM

## 2020-02-17 PROCEDURE — 77067 SCR MAMMO BI INCL CAD: CPT | Performed by: STUDENT IN AN ORGANIZED HEALTH CARE EDUCATION/TRAINING PROGRAM

## 2020-02-17 PROCEDURE — 77063 BREAST TOMOSYNTHESIS BI: CPT | Performed by: STUDENT IN AN ORGANIZED HEALTH CARE EDUCATION/TRAINING PROGRAM

## 2020-02-25 ENCOUNTER — ANCILLARY PROCEDURE (OUTPATIENT)
Dept: ULTRASOUND IMAGING | Facility: CLINIC | Age: 50
End: 2020-02-25
Attending: OBSTETRICS & GYNECOLOGY
Payer: COMMERCIAL

## 2020-02-25 ENCOUNTER — ANCILLARY PROCEDURE (OUTPATIENT)
Dept: MAMMOGRAPHY | Facility: CLINIC | Age: 50
End: 2020-02-25
Attending: OBSTETRICS & GYNECOLOGY
Payer: COMMERCIAL

## 2020-02-25 DIAGNOSIS — R92.8 ABNORMAL MAMMOGRAM: ICD-10-CM

## 2020-02-25 PROCEDURE — 77065 DX MAMMO INCL CAD UNI: CPT | Performed by: STUDENT IN AN ORGANIZED HEALTH CARE EDUCATION/TRAINING PROGRAM

## 2020-02-25 PROCEDURE — 76642 ULTRASOUND BREAST LIMITED: CPT | Mod: LT | Performed by: STUDENT IN AN ORGANIZED HEALTH CARE EDUCATION/TRAINING PROGRAM

## 2020-02-25 PROCEDURE — G0279 TOMOSYNTHESIS, MAMMO: HCPCS | Performed by: STUDENT IN AN ORGANIZED HEALTH CARE EDUCATION/TRAINING PROGRAM

## 2021-01-01 NOTE — OP NOTE
Procedure Date: 08/31/2018      PREOPERATIVE DIAGNOSIS:  Endometriosis, uterine fibroids, pelvic pain, anemia, dysmenorrhea, dyspareunia.      POSTOPERATIVE DIAGNOSIS:  Endometriosis, uterine fibroids, pelvic pain, anemia, dysmenorrhea, dyspareunia.      SURGEON:  Александр Smyth MD      ASSISTANT:  Dr. Poe.      PROCEDURE:  Total abdominal hysterectomy, left salpingo-oophorectomy, right salpingectomy, modified vault suspension, and lysis of adhesions.      ANESTHESIA:  General.      FINDINGS:  Fibroid uterus with multiple fibroids, approximately 8 weeks' size with endometriosis involving the rectum, attaching to the cul-de-sac and the posterior aspect of the uterus, bowel adhesions to the left ovary and to the uterus on the left side.  Bladder was clear and with no adhesions, and the rest of the peritoneal cavity had no adhesions and no active endometriosis noted.      DESCRIPTION OF PROCEDURE:  With the patient prepped and draped under general anesthesia, a repeat Pfannenstiel incision was performed cutting out the old incision, taking subcutaneous tissue sharply, opening the fascia sharply, and  the rectus muscles in the midline.  It was noted that on the left side of the patient, the rectus muscles were absent just underneath the pubic bone.  The peritoneum was then entered in a vertical fashion, and O'Caleb-O'Elton-Haja retractor was placed.  Bowel was packed back and findings were listed in detail above.  The uterus was elevated, and the bowel on the left side attached to the ovary and left side of the uterine fundus and sidewall was dissected carefully until the infundibulopelvic ligament was identified.  The infundibulopelvic ligament was then free-tied x 2, severed, and ovary and tube were dissected and left attached to the uterus at the moment.  The ureter was noted to be away from area of dissection and the attention was then turned to the right side.  The round ligament on the left  This note was copied from the mother's chart.  LC into room for flange size verification. Pt using 27mm flanges. They appear to be an appropriate size. Using Lanolin prior to pumping for comfort. Has hand free pump bra. Increased suction on pump from 4-6/7. Pt used heat and massage during pumping and will ice afterwards. Will continue to follow as needed.  ELVI Mars RN, BSN, PHN, IBCLC     side had been opened prior to that dissection so that the broad ligament was opened prior to dissecting the infundibulopelvic ligament.  The round ligament was then also suture ligated with figure-of-eight suture of #1 chromic suture on the right side, the broad ligament opened, and the bladder peritoneum was developed sharply and bluntly so to have leeway between the bladder and the lower uterine segment.        Attention was then turned to the utero-ovarian ligament, which was then free tied x 2, clamped and cut, and suture ligated with #1 chromic suture in a body fashion.  Excellent hemostasis was noted, and the tube and ovary were then free and not attached to the uterus at that point.  The ureter was then identified by palpation and noted to be away from area of dissection, and the uterine arteries were then skeletonized and doubly clamped, cut, suture ligated with #1 chromic suture and free tied with #1 chromic suture.  This ureter obviously was then taken on the left side in the same manner, but because of the rectum being attached high onto the fundus and dense, and visualization was difficult because of the uterine position, the fundus was then removed and the cervix was left in place so better visualization was noted so dissection would be easier.  The cervix was then elevated and with careful dissection, the cul-de-sac was then developed.  The rectum that was attached to the posterior aspect of the cervix and fundus was dissected away, leaving a small amount of fundal tissue onto the rectum to give leeway so as not to injure the rectum.  The cul-de-sac was then redeveloped.  The serial pedicles were taken on the broad ligament and the cardinal ligament was then taken, clamped, cut, and suture ligated with #1 chromic suture.  Then the vagina was entered posteriorly and the cervix was removed with a Gideon scissor with onion skinning technique prior to that removal.        The angle of the vagina was  then identified on both sides and suture ligated with #1 chromic suture in a figure-of-eight fashion, and these were held for the rest of the procedure.  The vagina was then closed using a running locked #1 Vicryl suture.  The then modified vault suspension was accomplished by identifying the uterosacral ligaments, attaching them to the vault lateral aspect going through the vault once again, coming out into the cul-de-sac, reefing the cul-de-sac, and then going through the uterosacral ligament on the left side, and doing the same technique until it was able to be tied so that the uterosacral ligaments tied into the cuff of the vagina and obliterating the cul-de-sac.  Excellent hemostasis was noted at that point, and the irrigation and suction were applied.  It was decided to take the right tube to decrease ovarian cancer possibilities for the future, and the right tube was then clamped, cut, and suture ligated after it was removed with 2-0 Vicryl suture.        At that point, the peritoneum was inspected and sponge and needle count were correct x 2.  The packs were removed.  The retractor was removed and the peritoneum was closed with an 0 Vicryl running suture.  Muscles were then approximated with a #1 chromic suture, but it was difficult to get the muscles to approximate on the left side underneath the pubic bone.  The fascia was then approximated using interrupted #1 Vicryl sutures.  The subcutaneous tissues were irrigated and approximated with 3-0 chromic running suture.  Skin was approximated with skin staples and Steri-Strips.        The patient tolerated the procedure well and left the operating room in good condition with an estimated blood loss of 100 mL.  Sponge and needle count reported correct x 3.      RECOMMENDATIONS:  This patient's endometriosis should not be an issue in the future.  She has one ovary left, but the ovary was quiet and free of adhesions and/or endometriosis, and I would doubt that  the endometriosis would be a problem for her in the future.  If so, that right ovary would be easy to remove laparoscopically in the future.         YAAKOV HULL MD             D: 2018   T: 2018   MT: JOSEY      Name:     MASON KWON   MRN:      [id number]        Account:        [id number]   :      1970           Procedure Date: 2018      Document: [id number]

## 2021-03-15 ENCOUNTER — ANCILLARY PROCEDURE (OUTPATIENT)
Dept: MAMMOGRAPHY | Facility: CLINIC | Age: 51
End: 2021-03-15
Attending: OBSTETRICS & GYNECOLOGY
Payer: COMMERCIAL

## 2021-03-15 DIAGNOSIS — Z12.31 SCREENING MAMMOGRAM, ENCOUNTER FOR: ICD-10-CM

## 2021-03-15 PROCEDURE — 77063 BREAST TOMOSYNTHESIS BI: CPT

## 2021-03-15 PROCEDURE — 77067 SCR MAMMO BI INCL CAD: CPT

## 2022-05-26 ENCOUNTER — ANCILLARY PROCEDURE (OUTPATIENT)
Dept: MAMMOGRAPHY | Facility: CLINIC | Age: 52
End: 2022-05-26
Attending: OBSTETRICS & GYNECOLOGY
Payer: COMMERCIAL

## 2022-05-26 DIAGNOSIS — Z12.31 VISIT FOR SCREENING MAMMOGRAM: ICD-10-CM

## 2022-05-26 PROCEDURE — 77063 BREAST TOMOSYNTHESIS BI: CPT | Mod: GC | Performed by: STUDENT IN AN ORGANIZED HEALTH CARE EDUCATION/TRAINING PROGRAM

## 2022-05-26 PROCEDURE — 77067 SCR MAMMO BI INCL CAD: CPT | Mod: GC | Performed by: STUDENT IN AN ORGANIZED HEALTH CARE EDUCATION/TRAINING PROGRAM

## 2022-06-02 ENCOUNTER — ANCILLARY PROCEDURE (OUTPATIENT)
Dept: ULTRASOUND IMAGING | Facility: CLINIC | Age: 52
End: 2022-06-02
Attending: OBSTETRICS & GYNECOLOGY
Payer: COMMERCIAL

## 2022-06-02 DIAGNOSIS — R92.8 ABNORMAL MAMMOGRAM: ICD-10-CM

## 2022-06-02 PROCEDURE — 76642 ULTRASOUND BREAST LIMITED: CPT | Mod: LT

## 2023-04-23 ENCOUNTER — HEALTH MAINTENANCE LETTER (OUTPATIENT)
Age: 53
End: 2023-04-23

## 2023-06-02 ENCOUNTER — ANCILLARY PROCEDURE (OUTPATIENT)
Dept: MAMMOGRAPHY | Facility: CLINIC | Age: 53
End: 2023-06-02
Attending: OBSTETRICS & GYNECOLOGY
Payer: COMMERCIAL

## 2023-06-02 DIAGNOSIS — Z12.31 VISIT FOR SCREENING MAMMOGRAM: ICD-10-CM

## 2023-06-02 PROCEDURE — 77063 BREAST TOMOSYNTHESIS BI: CPT | Mod: GC | Performed by: RADIOLOGY

## 2023-06-02 PROCEDURE — 77067 SCR MAMMO BI INCL CAD: CPT | Mod: GC | Performed by: RADIOLOGY

## 2024-04-27 ENCOUNTER — HEALTH MAINTENANCE LETTER (OUTPATIENT)
Age: 54
End: 2024-04-27

## 2024-07-18 ENCOUNTER — ANCILLARY PROCEDURE (OUTPATIENT)
Dept: MAMMOGRAPHY | Facility: CLINIC | Age: 54
End: 2024-07-18
Attending: OBSTETRICS & GYNECOLOGY
Payer: COMMERCIAL

## 2024-07-18 DIAGNOSIS — Z12.31 VISIT FOR SCREENING MAMMOGRAM: ICD-10-CM

## 2024-07-18 PROCEDURE — 77067 SCR MAMMO BI INCL CAD: CPT | Performed by: STUDENT IN AN ORGANIZED HEALTH CARE EDUCATION/TRAINING PROGRAM

## 2024-07-18 PROCEDURE — 77063 BREAST TOMOSYNTHESIS BI: CPT | Performed by: STUDENT IN AN ORGANIZED HEALTH CARE EDUCATION/TRAINING PROGRAM

## 2025-01-11 ENCOUNTER — HEALTH MAINTENANCE LETTER (OUTPATIENT)
Age: 55
End: 2025-01-11

## (undated) DEVICE — SU VICRYL 2-0 SH 27" J317H

## (undated) DEVICE — SPONGE TONSIL W/STRING LG LATEX

## (undated) DEVICE — SOL RINGERS LACTATED 1000ML BAG 2B2324X

## (undated) DEVICE — KIT ABDOMINAL HYST SMA15AHFSM

## (undated) DEVICE — ESU ELEC NDL 6" E1552-6

## (undated) DEVICE — SPONGE LAP 18X18" X8435

## (undated) DEVICE — SU CHROMIC 1 CTX 36" 905H

## (undated) DEVICE — SU CHROMIC 2-0 TIE 54" S113H

## (undated) DEVICE — SU CHROMIC 1 54" TIE S115H

## (undated) DEVICE — GLOVE PROTEXIS BLUE W/NEU-THERA 6.5  2D73EB65

## (undated) DEVICE — GLOVE PROTEXIS W/NEU-THERA 7.5  2D73TE75

## (undated) DEVICE — GLOVE PROTEXIS W/NEU-THERA 6.5  2D73TE65

## (undated) DEVICE — ESU GROUND PAD UNIVERSAL W/O CORD

## (undated) DEVICE — SU VICRYL 1 CT-1 27" J341H

## (undated) DEVICE — DRSG STERI STRIP 1/4X3" R1541

## (undated) DEVICE — LINEN TOWEL PACK X5 5464

## (undated) DEVICE — CATH TRAY FOLEY SURESTEP 16FR WDRAIN BAG STLK LATEX A300316A

## (undated) DEVICE — BLADE CLIPPER 4406

## (undated) DEVICE — SU VICRYL 0 CT-1 CR 8X18" J740D

## (undated) DEVICE — ESU ELEC NDL 1" E1552

## (undated) DEVICE — SU CHROMIC 1 CT 27" 803H

## (undated) DEVICE — SOL WATER IRRIG 1000ML BOTTLE 2F7114

## (undated) DEVICE — SU CHROMIC 0 CT 27" 802H

## (undated) DEVICE — SU VICRYL 0 CT-1 27" J340H

## (undated) DEVICE — WIPES FOLEY CARE SURESTEP PROVON DFC100

## (undated) DEVICE — PREP SKIN SCRUB TRAY 4461A

## (undated) DEVICE — STPL SKIN PROXIMATE 35 WIDE PMW35

## (undated) DEVICE — DRSG TEGADERM 4X4 3/4" 1626

## (undated) DEVICE — SU CHROMIC 3-0 CT-1 27" 810H

## (undated) DEVICE — ESU CLEANER TIP 31142717

## (undated) DEVICE — SUCTION CANISTER MEDIVAC LINER 3000ML W/LID 65651-530

## (undated) DEVICE — BLADE KNIFE SURG 10 371110

## (undated) RX ORDER — PROPOFOL 10 MG/ML
INJECTION, EMULSION INTRAVENOUS
Status: DISPENSED
Start: 2018-08-31

## (undated) RX ORDER — ONDANSETRON 2 MG/ML
INJECTION INTRAMUSCULAR; INTRAVENOUS
Status: DISPENSED
Start: 2018-08-31

## (undated) RX ORDER — SCOLOPAMINE TRANSDERMAL SYSTEM 1 MG/1
PATCH, EXTENDED RELEASE TRANSDERMAL
Status: DISPENSED
Start: 2018-08-31

## (undated) RX ORDER — LIDOCAINE HYDROCHLORIDE 20 MG/ML
INJECTION, SOLUTION EPIDURAL; INFILTRATION; INTRACAUDAL; PERINEURAL
Status: DISPENSED
Start: 2018-08-31

## (undated) RX ORDER — HYDROMORPHONE HYDROCHLORIDE 1 MG/ML
INJECTION, SOLUTION INTRAMUSCULAR; INTRAVENOUS; SUBCUTANEOUS
Status: DISPENSED
Start: 2018-08-31

## (undated) RX ORDER — CEFAZOLIN SODIUM 2 G/100ML
INJECTION, SOLUTION INTRAVENOUS
Status: DISPENSED
Start: 2018-08-31

## (undated) RX ORDER — NEOSTIGMINE METHYLSULFATE 1 MG/ML
VIAL (ML) INJECTION
Status: DISPENSED
Start: 2018-08-31

## (undated) RX ORDER — FENTANYL CITRATE 50 UG/ML
INJECTION, SOLUTION INTRAMUSCULAR; INTRAVENOUS
Status: DISPENSED
Start: 2018-08-31

## (undated) RX ORDER — DEXAMETHASONE SODIUM PHOSPHATE 4 MG/ML
INJECTION, SOLUTION INTRA-ARTICULAR; INTRALESIONAL; INTRAMUSCULAR; INTRAVENOUS; SOFT TISSUE
Status: DISPENSED
Start: 2018-08-31

## (undated) RX ORDER — KETOROLAC TROMETHAMINE 30 MG/ML
INJECTION, SOLUTION INTRAMUSCULAR; INTRAVENOUS
Status: DISPENSED
Start: 2018-08-31

## (undated) RX ORDER — CEFAZOLIN SODIUM 1 G/3ML
INJECTION, POWDER, FOR SOLUTION INTRAMUSCULAR; INTRAVENOUS
Status: DISPENSED
Start: 2018-08-31

## (undated) RX ORDER — VECURONIUM BROMIDE 1 MG/ML
INJECTION, POWDER, LYOPHILIZED, FOR SOLUTION INTRAVENOUS
Status: DISPENSED
Start: 2018-08-31

## (undated) RX ORDER — GLYCOPYRROLATE 0.2 MG/ML
INJECTION, SOLUTION INTRAMUSCULAR; INTRAVENOUS
Status: DISPENSED
Start: 2018-08-31